# Patient Record
Sex: MALE | Race: WHITE
[De-identification: names, ages, dates, MRNs, and addresses within clinical notes are randomized per-mention and may not be internally consistent; named-entity substitution may affect disease eponyms.]

---

## 2019-02-01 ENCOUNTER — HOSPITAL ENCOUNTER (OUTPATIENT)
Dept: HOSPITAL 89 - ER | Age: 59
Setting detail: OBSERVATION
LOS: 3 days | Discharge: HOME | End: 2019-02-04
Attending: INTERNAL MEDICINE | Admitting: INTERNAL MEDICINE
Payer: SELF-PAY

## 2019-02-01 VITALS — DIASTOLIC BLOOD PRESSURE: 86 MMHG | SYSTOLIC BLOOD PRESSURE: 161 MMHG

## 2019-02-01 VITALS
BODY MASS INDEX: 40.32 KG/M2 | WEIGHT: 266 LBS | HEIGHT: 68 IN | BODY MASS INDEX: 40.32 KG/M2 | HEIGHT: 68 IN | WEIGHT: 266 LBS

## 2019-02-01 VITALS — DIASTOLIC BLOOD PRESSURE: 84 MMHG | SYSTOLIC BLOOD PRESSURE: 149 MMHG

## 2019-02-01 VITALS — DIASTOLIC BLOOD PRESSURE: 60 MMHG | SYSTOLIC BLOOD PRESSURE: 117 MMHG

## 2019-02-01 DIAGNOSIS — N45.3: ICD-10-CM

## 2019-02-01 DIAGNOSIS — I48.91: ICD-10-CM

## 2019-02-01 DIAGNOSIS — Z87.442: ICD-10-CM

## 2019-02-01 DIAGNOSIS — N50.812: ICD-10-CM

## 2019-02-01 DIAGNOSIS — N20.0: Primary | ICD-10-CM

## 2019-02-01 DIAGNOSIS — R93.41: ICD-10-CM

## 2019-02-01 LAB — PLATELET COUNT, AUTOMATED: 261 K/UL (ref 150–450)

## 2019-02-01 PROCEDURE — 83690 ASSAY OF LIPASE: CPT

## 2019-02-01 PROCEDURE — 96375 TX/PRO/DX INJ NEW DRUG ADDON: CPT

## 2019-02-01 PROCEDURE — 84443 ASSAY THYROID STIM HORMONE: CPT

## 2019-02-01 PROCEDURE — 84155 ASSAY OF PROTEIN SERUM: CPT

## 2019-02-01 PROCEDURE — 85025 COMPLETE CBC W/AUTO DIFF WBC: CPT

## 2019-02-01 PROCEDURE — 84520 ASSAY OF UREA NITROGEN: CPT

## 2019-02-01 PROCEDURE — 82435 ASSAY OF BLOOD CHLORIDE: CPT

## 2019-02-01 PROCEDURE — 99285 EMERGENCY DEPT VISIT HI MDM: CPT

## 2019-02-01 PROCEDURE — 84460 ALANINE AMINO (ALT) (SGPT): CPT

## 2019-02-01 PROCEDURE — 96361 HYDRATE IV INFUSION ADD-ON: CPT

## 2019-02-01 PROCEDURE — 82040 ASSAY OF SERUM ALBUMIN: CPT

## 2019-02-01 PROCEDURE — 82310 ASSAY OF CALCIUM: CPT

## 2019-02-01 PROCEDURE — 96374 THER/PROPH/DIAG INJ IV PUSH: CPT

## 2019-02-01 PROCEDURE — 82565 ASSAY OF CREATININE: CPT

## 2019-02-01 PROCEDURE — 96376 TX/PRO/DX INJ SAME DRUG ADON: CPT

## 2019-02-01 PROCEDURE — 74178 CT ABD&PLV WO CNTR FLWD CNTR: CPT

## 2019-02-01 PROCEDURE — 93306 TTE W/DOPPLER COMPLETE: CPT

## 2019-02-01 PROCEDURE — 93225 XTRNL ECG REC<48 HRS REC: CPT

## 2019-02-01 PROCEDURE — 84295 ASSAY OF SERUM SODIUM: CPT

## 2019-02-01 PROCEDURE — 84075 ASSAY ALKALINE PHOSPHATASE: CPT

## 2019-02-01 PROCEDURE — 84132 ASSAY OF SERUM POTASSIUM: CPT

## 2019-02-01 PROCEDURE — 36415 COLL VENOUS BLD VENIPUNCTURE: CPT

## 2019-02-01 PROCEDURE — 93226 XTRNL ECG REC<48 HR SCAN A/R: CPT

## 2019-02-01 PROCEDURE — 76870 US EXAM SCROTUM: CPT

## 2019-02-01 PROCEDURE — 93005 ELECTROCARDIOGRAM TRACING: CPT

## 2019-02-01 PROCEDURE — 82947 ASSAY GLUCOSE BLOOD QUANT: CPT

## 2019-02-01 PROCEDURE — 84450 TRANSFERASE (AST) (SGOT): CPT

## 2019-02-01 PROCEDURE — 87186 SC STD MICRODIL/AGAR DIL: CPT

## 2019-02-01 PROCEDURE — 82247 BILIRUBIN TOTAL: CPT

## 2019-02-01 PROCEDURE — 87088 URINE BACTERIA CULTURE: CPT

## 2019-02-01 PROCEDURE — 82374 ASSAY BLOOD CARBON DIOXIDE: CPT

## 2019-02-01 PROCEDURE — 81001 URINALYSIS AUTO W/SCOPE: CPT

## 2019-02-01 RX ADMIN — KETOROLAC TROMETHAMINE SCH MG: 30 INJECTION, SOLUTION INTRAMUSCULAR; INTRAVENOUS at 23:28

## 2019-02-01 RX ADMIN — THIAMINE HYDROCHLORIDE PRN MLS/HR: 100 INJECTION, SOLUTION INTRAMUSCULAR; INTRAVENOUS at 17:04

## 2019-02-01 RX ADMIN — KETOROLAC TROMETHAMINE SCH MG: 30 INJECTION, SOLUTION INTRAMUSCULAR; INTRAVENOUS at 18:10

## 2019-02-01 RX ADMIN — OXYCODONE HYDROCHLORIDE PRN MG: 5 TABLET ORAL at 17:04

## 2019-02-01 NOTE — HISTORY & PHYSICAL
History of Present Illness


Chief Complaint


L flank pain


History of Present Illness


58M presented with 3-4 days worsening L flank pain. PMHx significant for 4 


episodes of renal stones. Reports pain did not allow him to sleep last night, 


unable to find position of comfort and pain has been getting worse. Reports it 


is similar to after he has passed a stone previously. WBC significantly 


elevated, UA concerning for infection. CT shows distal R ureteral thickening 


above UPJ, L non obstructing stone in renal pelvis, some prostatic hypertrophy 


and mild symmetric bladder wall thickening. No evidence of fat stranding.





History


Problems:  


(1) Renal stones


Home Meds


Reported Medications


Citrulline (L-CITRULLINE) 25 Gm Powder, 25 GM MC QDAY


   2/1/19


Saw [place] (SAW PALMETTO) 500 Mg Capsule, 500 MG PO QDAY, CAPSULE


   2/1/19


Discontinued Scripts


Lorazepam (ATIVAN) 0.5 Mg Tablet, 0.5 MG PO Q4-6H PRN for ANXIETY, #10 TAB


   Prov:MONA PAUL FNP         1/10/17


Allergies:  


Coded Allergies:  


     No Known Drug Allergies (Unverified , 1/10/17)


Patient History:  


FH: HTN (hypertension)


Hx Smoking:  No


Caffeine Intake:  Coffee


Hx Alcohol Use:  Yes (occ)


Hx Substance Use Disorder:  Yes


Social Drugs:  Marijuana





Review of Systems


All Systems Reviewed/Normal:  Yes, Except as Noted


Constitutional:  No Fever


Gastrointestinal:  No Nausea, No Vomiting


Genitourinary:  Other (L flank pain)





Exam


Vital Signs





Vital Signs








  Date Time  Temp Pulse Resp B/P (MAP) Pulse Ox O2 Delivery O2 Flow Rate FiO2


 


2/1/19 17:21     93   


 


2/1/19 17:16      Nasal Cannula  


 


2/1/19 16:01 98.3 68 18 161/86 (111)   2.0 








General Appearance:  Alert, Awake, Afebrile (appears uncomfortable, no position 


of comfort)


Neuro:  No Gross deficits


ENT:  Normal


Cardiovascular:  Normal Rhythm & Peripheral Pulses


Respiratory:  No Respiratory Distress


GI:  Abd Soft and Non-Tender


Extremities:  Soft and Non Tender, Warm, Pulses, Perfused


Integumentary:  Skin Intact without Lesion / Mass





Medical Decision Making


Data Points


Result Diagram:  


2/1/19 1003                                                                     


          2/1/19 1003








Assessment and Plan


Problems:  


(1) Pyelonephritis


Status:  Acute


Assessment & Plan:  Elevated WBC count, UA appears to be infected, with L flank 


pain. Picture is also compatible with passage of renal stone possibly infected. 


Empiric ceftriaxone, pain control, hydration, monitor urine Cx. If doing well 


could discharge tomorrow. 





(2) Abnormal computed tomography of ureter


Assessment & Plan:  R ureteral thickening possibly reactive to one of his 


previous kidney stones. Recommend outpatient UA to evaluate for blood after out 


of acute phase vs CT with contrast during ureteral phase to evaluate for filling


defect. There are also indeterminant masses of L adrenal (2.8cm) and L kidney 


(1.8cm) which should be further characterized and borderline retroperitoneal 


lymphadenopathy possibly reactive in nature.








Venous Thromboembolism


Antithrombotics


Is Pt On Any Antithrombotics?:  Yes





Exam


Sepsis Risk:  No Definite Risk











THORNE DELGADO VALERA DO        Feb 1, 2019 21:04

## 2019-02-01 NOTE — ER REPORT
History and Physical


Time Seen By MD:  09:49


HPI/ROS


CHIEF COMPLAINT: Left flank, left lower abdominal pain.





HISTORY OF PRESENT ILLNESS: Patient is a 58-year-old male who was involved in 


[place]. States that yesterday evening. Tonight he developed worsening left 


back left flank and left groin pain. Patient does have a prior history of kidney


stones but states the pain feels different it is much more severe. Patient is 


unable to find a position of comfort. He does however preferred to lay on his 


right side. Pain is associated with some nausea. He denies any fevers or chills.


Denies any history of hypertension. Denies any other real significant past 


medical history.





REVIEW OF SYSTEMS:


Constitutional: No fever, no chills.


Eyes: No discharge.


ENT: No sore throat. 


Cardiovascular: No chest pain, no palpitations.


Respiratory: No cough, no shortness of breath.


Gastrointestinal: Severe left lower quadrant and left flank abdominal pain.


Genitourinary: No hematuria. Left scrotal pain


Musculoskeletal: Left sided back pain


Skin: No rashes.


Neurological: No headache.


Allergies:  


Coded Allergies:  


     No Known Drug Allergies (Unverified , 1/10/17)


Home Meds


Reported Medications


Citrulline (L-CITRULLINE) 25 Gm Powder, 25 GM MC


   2/1/19


Saw [place] (SAW PALMETTO) 500 Mg Capsule, 500 MG PO, CAPSULE


   2/1/19


Discontinued Scripts


Lorazepam (ATIVAN) 0.5 Mg Tablet, 0.5 MG PO Q4-6H PRN for ANXIETY, #10 TAB


   Prov:MONA PAUL FNKEARA         1/10/17


Past Medical/Surgical History


Patient denies any pertinent medical or surgical history.


Hx Substance Use Disorder:  No


Constitutional





Vital Sign - Last 24 Hours








 2/1/19 2/1/19 2/1/19 2/1/19





 09:57 09:58 10:01 10:16


 


Temp 98.2   


 


Pulse 73  71 74


 


Resp 22   10


 


B/P (MAP) 162/81 162/81 (108)  


 


Pulse Ox 92  92 95


 


O2 Delivery Room Air   


 


    





 2/1/19 2/1/19 2/1/19 2/1/19





 10:18 10:26 10:30 10:31


 


Pulse    71


 


Resp    21


 


B/P (MAP) 129/75 (93)  144/100 (115) 


 


Pulse Ox    91


 


O2 Flow Rate  2.0  





 2/1/19 2/1/19  





 10:46 11:02  


 


Pulse ???   


 


B/P (MAP)  151/79 (103)  








Physical Exam


General/Constitutional: Patient is awake, alert, nontoxic and in no acute 


respiratory distress. Patient moving on the gurney in extreme discomfort.


Head: Normocephalic and atraumatic.


Eyes: Conjunctival clear, Pupils are equal and reactive to light. Extraocular 


muscles are intact and symmetrical. Sclera are clear and anicteric.


Ears:External canals are clear. Tympanic membranes are clear with normal jose martin


dmarks and light reflex.


Nares: No rhinorrhea or bleeding. Turbinates are pink and moist.


Oropharyngeal: Mucous membranes are moist. 


Neck: Supple, no adenopathy.


Cardiovascular: Heart is regular rate and rhythm without audible murmurs, rubs 


or gallops. 


Pulmonary: Lungs are clear to auscultation bilaterally. There are no wheezes, 


rales, or rhonchi. Chest rise is symmetrical


Abdomen: Soft, nontender, no guarding or peritoneal signs.


Extremities: No gross deformities, No peripheral cyanosis. Able to move all 4 


extremities.


Neuro: Alert and oriented X3, 


Skin: No rashes, skin is warm dry and well perfused.





Medical Decision Making


Data Points


Result Diagram:  


2/1/19 1003                                                                     


          2/1/19 1003





Laboratory





Hematology








Test


 2/1/19


10:03 2/1/19


11:05


 


Red Blood Count


 5.78 M/uL


(4.00-5.60) 





 


Mean Corpuscular Volume


 86.5 fL


(80.0-96.0) 





 


Mean Corpuscular Hemoglobin


 29.9 pg


(26.0-33.0) 





 


Mean Corpuscular Hemoglobin


Concent 34.6 g/dL


(32.0-36.0) 





 


Red Cell Distribution Width


 13.5 %


(11.5-14.5) 





 


Mean Platelet Volume


 7.5 fL


(7.2-11.1) 





 


Neutrophils (%) (Auto)


 90.3 %


(39.4-72.5) 





 


Lymphocytes (%) (Auto)


 3.2 %


(17.6-49.6) 





 


Monocytes (%) (Auto)


 5.9 %


(4.1-12.4) 





 


Eosinophils (%) (Auto)


 0.3 %


(0.4-6.7) 





 


Basophils (%) (Auto)


 0.3 %


(0.3-1.4) 





 


Nucleated RBC Relative Count


(auto) 0.0 /100WBC 


 





 


Neutrophils # (Auto)


 21.7 K/uL


(2.0-7.4) 





 


Lymphocytes # (Auto)


 0.8 K/uL


(1.3-3.6) 





 


Monocytes # (Auto)


 1.4 K/uL


(0.3-1.0) 





 


Eosinophils # (Auto)


 0.1 K/uL


(0.0-0.5) 





 


Basophils # (Auto)


 0.1 K/uL


(0.0-0.1) 





 


Nucleated RBC Absolute Count


(auto) 0.01 K/uL 


 





 


Peripheral Blood Smear Yes Y/N  


 


Sodium Level


 136 mmol/L


(137-145) 





 


Potassium Level


 4.2 mmol/L


(3.5-5.0) 





 


Chloride Level


 108 mmol/L


() 





 


Carbon Dioxide Level


 20 mmol/L


(22-30) 





 


Blood Urea Nitrogen


 17 mg/dl


(9-21) 





 


Creatinine


 1.00 mg/dl


(0.66-1.25) 





 


Glomerular Filtration Rate


Calc > 60.0 


 





 


Random Glucose


 193 mg/dl


() 





 


Calcium Level


 9.6 mg/dl


(8.4-10.2) 





 


Total Bilirubin


 1.6 mg/dl


(0.2-1.3) 





 


Aspartate Amino Transf


(AST/SGOT) 30 U/L (0-35) 


 





 


Alanine Aminotransferase


(ALT/SGPT) 64 U/L (0-56) 


 





 


Alkaline Phosphatase 74 U/L (0-126)  


 


Total Protein


 7.8 g/dl


(6.3-8.2) 





 


Albumin


 4.7 g/dl


(3.5-5.0) 





 


Lipase


 76 U/L


() 





 


Urine Color  Yellow 


 


Urine Clarity  Clear 


 


Urine pH


 


 5.0 pH


(4.8-9.5)


 


Urine Specific Gravity  1.041 


 


Urine Protein


 


 30 mg/dL


(NEGATIVE)


 


Urine Glucose (UA)


 


 50 mg/dL


(NEGATIVE)


 


Urine Ketones


 


 20 mg/dL


(NEGATIVE)


 


Urine Blood


 


 Small


(NEGATIVE)


 


Urine Nitrite


 


 Positive


(NEGATIVE)


 


Urine Bilirubin


 


 Negative


(NEGATIVE)


 


Urine Urobilinogen


 


 Negative mg/dL


(0.2-1.9)


 


Urine Leukocyte Esterase


 


 Large


(NEGATIVE)


 


Urine RBC


 


 4 /HPF


(0-2/HPF)


 


Urine WBC


 


 49 /HPF


(0-5/HPF)


 


Urine Squamous Epithelial


Cells 


 Few /LPF


(</=FEW)


 


Urine Bacteria


 


 Moderate /HPF


(NONE-FEW)


 


Urine Mucus


 


 Few /HPF


(NONE-FEW)








Chemistry








Test


 2/1/19


10:03 2/1/19


11:05


 


White Blood Count


 24.0 k/uL


(4.5-11.0) 





 


Red Blood Count


 5.78 M/uL


(4.00-5.60) 





 


Hemoglobin


 17.3 g/dL


(14.0-18.0) 





 


Hematocrit


 50.0 %


(42.0-52.0) 





 


Mean Corpuscular Volume


 86.5 fL


(80.0-96.0) 





 


Mean Corpuscular Hemoglobin


 29.9 pg


(26.0-33.0) 





 


Mean Corpuscular Hemoglobin


Concent 34.6 g/dL


(32.0-36.0) 





 


Red Cell Distribution Width


 13.5 %


(11.5-14.5) 





 


Platelet Count


 261 K/uL


(150-450) 





 


Mean Platelet Volume


 7.5 fL


(7.2-11.1) 





 


Neutrophils (%) (Auto)


 90.3 %


(39.4-72.5) 





 


Lymphocytes (%) (Auto)


 3.2 %


(17.6-49.6) 





 


Monocytes (%) (Auto)


 5.9 %


(4.1-12.4) 





 


Eosinophils (%) (Auto)


 0.3 %


(0.4-6.7) 





 


Basophils (%) (Auto)


 0.3 %


(0.3-1.4) 





 


Nucleated RBC Relative Count


(auto) 0.0 /100WBC 


 





 


Neutrophils # (Auto)


 21.7 K/uL


(2.0-7.4) 





 


Lymphocytes # (Auto)


 0.8 K/uL


(1.3-3.6) 





 


Monocytes # (Auto)


 1.4 K/uL


(0.3-1.0) 





 


Eosinophils # (Auto)


 0.1 K/uL


(0.0-0.5) 





 


Basophils # (Auto)


 0.1 K/uL


(0.0-0.1) 





 


Nucleated RBC Absolute Count


(auto) 0.01 K/uL 


 





 


Peripheral Blood Smear Yes Y/N  


 


Glomerular Filtration Rate


Calc > 60.0 


 





 


Calcium Level


 9.6 mg/dl


(8.4-10.2) 





 


Total Bilirubin


 1.6 mg/dl


(0.2-1.3) 





 


Aspartate Amino Transf


(AST/SGOT) 30 U/L (0-35) 


 





 


Alanine Aminotransferase


(ALT/SGPT) 64 U/L (0-56) 


 





 


Alkaline Phosphatase 74 U/L (0-126)  


 


Total Protein


 7.8 g/dl


(6.3-8.2) 





 


Albumin


 4.7 g/dl


(3.5-5.0) 





 


Lipase


 76 U/L


() 





 


Urine Color  Yellow 


 


Urine Clarity  Clear 


 


Urine pH


 


 5.0 pH


(4.8-9.5)


 


Urine Specific Gravity  1.041 


 


Urine Protein


 


 30 mg/dL


(NEGATIVE)


 


Urine Glucose (UA)


 


 50 mg/dL


(NEGATIVE)


 


Urine Ketones


 


 20 mg/dL


(NEGATIVE)


 


Urine Blood


 


 Small


(NEGATIVE)


 


Urine Nitrite


 


 Positive


(NEGATIVE)


 


Urine Bilirubin


 


 Negative


(NEGATIVE)


 


Urine Urobilinogen


 


 Negative mg/dL


(0.2-1.9)


 


Urine Leukocyte Esterase


 


 Large


(NEGATIVE)


 


Urine RBC


 


 4 /HPF


(0-2/HPF)


 


Urine WBC


 


 49 /HPF


(0-5/HPF)


 


Urine Squamous Epithelial


Cells 


 Few /LPF


(</=FEW)


 


Urine Bacteria


 


 Moderate /HPF


(NONE-FEW)


 


Urine Mucus


 


 Few /HPF


(NONE-FEW)








Urinalysis








Test


 2/1/19


11:05


 


Urine Color Yellow 


 


Urine Clarity Clear 


 


Urine pH


 5.0 pH


(4.8-9.5)


 


Urine Specific Gravity 1.041 


 


Urine Protein


 30 mg/dL


(NEGATIVE)


 


Urine Glucose (UA)


 50 mg/dL


(NEGATIVE)


 


Urine Ketones


 20 mg/dL


(NEGATIVE)


 


Urine Blood


 Small


(NEGATIVE)


 


Urine Nitrite


 Positive


(NEGATIVE)


 


Urine Bilirubin


 Negative


(NEGATIVE)


 


Urine Urobilinogen


 Negative mg/dL


(0.2-1.9)


 


Urine Leukocyte Esterase


 Large


(NEGATIVE)


 


Urine RBC


 4 /HPF


(0-2/HPF)


 


Urine WBC


 49 /HPF


(0-5/HPF)


 


Urine Squamous Epithelial


Cells Few /LPF


(</=FEW)


 


Urine Bacteria


 Moderate /HPF


(NONE-FEW)


 


Urine Mucus


 Few /HPF


(NONE-FEW)











ED Course/Re-evaluation


ED Course


 02/01/2019 10:55:41 am pain has improved since administration of 1 mg of 


Dilaudid, 15 of IV Toradol, 4 of Zofran. Blood cell count shows white count of 


24,000 with 90% neutrophils. Waiting results of CT scan of the abdomen and pe


lvis.





 02/01/2019 11:32:48 am urinalysis is highly suggestive of infection based on 


elevated white count and nitrites. We'll give IV Rocephin. We'll also obtain a 


urine culture.


Decision to Disposition Date:  Feb 1, 2019


Decision to Disposition Time:  12:00





Depart


Departure


Latest Vital Signs





Vital Signs








  Date Time  Temp Pulse Resp B/P (MAP) Pulse Ox O2 Delivery O2 Flow Rate FiO2


 


2/1/19 11:02    151/79 (103)    


 


2/1/19 10:46  ???      


 


2/1/19 10:31   21  91   


 


2/1/19 10:26       2.0 


 


2/1/19 09:57 98.2     Room Air  








Impression:  


   Primary Impression:  


   Pyelonephritis


Condition:  Improved


Disposition:  Admitted from ER (to DR Robert)











DILLON LI MD              Feb 1, 2019 09:49

## 2019-02-01 NOTE — RADIOLOGY IMAGING REPORT
FACILITY: [facility] [place] 

 

PATIENT NAME: Santosh Lara

: 1960

MR: [id number]

V: [id number]

EXAM DATE: 

ORDERING PHYSICIAN: DILLON LI

TECHNOLOGIST: 

 

Location: [facility] [place]

Patient: Santosh Lara

: 1960

MRN: [id number]

Visit/Account:[id number]

Date of Sevice:  2019

 

ACCESSION #: [id number]

 

CT ABDOMEN PELVIS W & W/O CONTRAST

 

HISTORY:  Right flank pain, left abdomen pain

 

TECHNIQUE: Axial images acquired through the abdomen/pelvis both with and without IV contrast..  Joseph
nal and sagittal reformatting also performed.Dose Lowering Technique

 

One of the following dose optimization techniques was utilized in the performance of this exam: Autom
ated exposure control; adjustment of the mA and/or kV according to the patient's size; or use of an i
terative  reconstruction technique.  Specific details can be referenced in the facility's radiology C
T exam operational policy.

 

CONTRAST:  75 mL Isovue-370

 

COMPARISON:  None.

 

FINDINGS:

 

Visualized lung bases:  There is a 2 mm noncalcified pulmonary nodule anterolateral inferior right mi
ddle lobe best seen on image 34 of series 5

 

Hepatobiliary:  There is a 1 cm cyst lateral segment left lobe of the liver.  There is diffuse hepati
c steatosis and mild hepatomegaly

 

Spleen:  Borderline splenomegaly with spleen measuring 13.8 cm in length

 

Adrenals:  There is a 2.8 cm indeterminate left adrenal mass.  The right adrenal gland appears unrema
rkable

 

Pancreas:  Negative.

 

Kidneys ureters and bladder: There is a five mm nonobstructing calculus lower pole calyx of the right
 kidney .  There is a 1 to 2 mm calcification upper pole of the left kidney may be intraparenchymal..
  There is a 3 mm calcification interpolar region of the left kidney which may also be intraparenchym
al.

 

There is a 1.8 cm indeterminate isodense mass projecting from the lower pole of the left kidney that 
demonstrates mild contrast enhancement.  There is a 1.5 cm cyst upper pole of the right kidney and a 
subcentimeter indeterminate round hypodensity upper pole of the left kidney and an additional subcent
imeter hypodensity interpolar region of the left kidney both of these are too small to characterize b
y CT.

 

The bladder wall is mildly thickened

 

The distal right ureter at the right UVJ appears somewhat thickened

 

Genitalia:  Prostate gland is moderately enlarged impinging upon the floor the bladder

 

GI:  There is diverticulosis of the left-sided the colon although no CT evidence of acute diverticuli
tis  .  The appendix is visualized and does not appear inflamed

 

Vessels/spaces/nodes:  There is scattered mildly prominent retroperitoneal lymph nodes present a repr
esentative aortocaval lymph node measures 1.3 x 1.1 x 1.8 cm a preaortic lymph node measures 1 x 1.1 
x 1.4 cm right external iliac lymph node measures 1.2 x 1 x 1.4 cm left external iliac lymph node juancarlos
sures 1.5 x 1.1 x 1 cm

 

Bones/soft tissues:  There is a small umbilical hernia containing fat.  There are small bilateral ing
uinal hernias containing fat, left greater than right.

 

There are spondylotic changes of the thoracolumbar spine

 

Additional findings:  None pertinent.

 

IMPRESSION:

 

There is a 2 mm noncalcified pulmonary nodule anterolateral inferior right middle lobe

 

----------

F[employer] SOCIET[employer] FOLLOW-UP GUIDELINES FOR NEWLY DETECTED INCIDENTAL NODULES IN PERSONS 35 YEARS OF 
AGE OR OLDER.

 

*These recommendations do NOT apply to lung cancer screening, patients with immunosuppression or carolyn
ents with a known primary malignancy.

 

SOLITARY SOLID NODULE

 

If nodule size is < 6 mm:

*  Low risk patient ? No routine follow-up.

*  High risk patient ? Optional CT at 12 months.

 

If nodule size is 6-8 mm:

*  Low risk patient ? CT at 6-12 months, then consider CT at 18-24 months if no change.

*  High risk patient ? CT at 6-12 months, then CT at 18-24 months if no change.

 

If nodule size is > 8 mm:

*  Low risk patient ? Consider CT at 3, 9 and 24 months (if no change), PET/CT, tissue sampling or a 
combination thereof.

*  High risk patient ? Consider CT at 3, 9 and 24 months (if no change), PET/CT, tissue sampling, or 
a combination thereof.

 

LOW RISK PATIENT: Minimal or absent history of tobacco use and of other known risk factors.HIGH RISK 
PATIENT: Tobacco use, family history of lung cancer, upper pulmonary lobe location of nodule, presenc
e of emphysema, pulmonary fibrosis, older age.

 

Jorge H, Sebastián DP, Yefri CEDEÑO, et al. Guidelines for Management of Incidental Pulmonary Nodules Dete
cted on CT Images: From the Fleischner Society 2017. Radiology.

 

uchnipn

 

 

 

Diffuse hepatic steatosis and mild hepatomegaly

 

Borderline splenomegaly

 

There is a 2.8 cm indeterminate left adrenal mass.  This could be further evaluated with MR or dedica
annie CT of the adrenal glands with and without contrast

 

There is a 5 mm nonobstructing calculus lower pole calyx of the right kidney.

 

There is thickening of the distal right ureter at the right UVJ which is not ideally evaluated due to
 lack of intraluminal contrast 2.  Small calcified occasions within the left kidney may be intraparen
chymal

 

There is 1.8 cm indeterminate mass lower pole the left kidney which could be further evaluated with M
R

 

Additional hypodensities in the kidneys that are too small to characterize

 

Bladder wall is mildly thickened

 

Prostate gland is moderately enlarged impinging upon the floor the bladder

 

Diverticulosis of the left side of the colon although no CT evidence of acute diverticulitis.

 

There are mildly prominent retroperitoneal lymph nodes as detailed above.  Although these could be re
active although clinical follow-up recommended to exclude malignancy

 

Small umbilical hernia containing fat

## 2019-02-02 VITALS — DIASTOLIC BLOOD PRESSURE: 77 MMHG | SYSTOLIC BLOOD PRESSURE: 136 MMHG

## 2019-02-02 VITALS — SYSTOLIC BLOOD PRESSURE: 124 MMHG | DIASTOLIC BLOOD PRESSURE: 85 MMHG

## 2019-02-02 VITALS — DIASTOLIC BLOOD PRESSURE: 104 MMHG | SYSTOLIC BLOOD PRESSURE: 133 MMHG

## 2019-02-02 VITALS — SYSTOLIC BLOOD PRESSURE: 131 MMHG | DIASTOLIC BLOOD PRESSURE: 80 MMHG

## 2019-02-02 LAB — PLATELET COUNT, AUTOMATED: 222 K/UL (ref 150–450)

## 2019-02-02 RX ADMIN — KETOROLAC TROMETHAMINE SCH MG: 30 INJECTION, SOLUTION INTRAMUSCULAR; INTRAVENOUS at 23:30

## 2019-02-02 RX ADMIN — ONDANSETRON HYDROCHLORIDE PRN MG: 2 INJECTION, SOLUTION INTRAMUSCULAR; INTRAVENOUS at 18:53

## 2019-02-02 RX ADMIN — OXYCODONE HYDROCHLORIDE PRN MG: 5 TABLET ORAL at 09:05

## 2019-02-02 RX ADMIN — OXYCODONE HYDROCHLORIDE PRN MG: 5 TABLET ORAL at 01:02

## 2019-02-02 RX ADMIN — KETOROLAC TROMETHAMINE SCH MG: 30 INJECTION, SOLUTION INTRAMUSCULAR; INTRAVENOUS at 11:54

## 2019-02-02 RX ADMIN — SODIUM CHLORIDE SCH MLS/HR: 900 INJECTION, SOLUTION INTRAVENOUS at 14:00

## 2019-02-02 RX ADMIN — THIAMINE HYDROCHLORIDE PRN MLS/HR: 100 INJECTION, SOLUTION INTRAMUSCULAR; INTRAVENOUS at 03:08

## 2019-02-02 RX ADMIN — KETOROLAC TROMETHAMINE SCH MG: 30 INJECTION, SOLUTION INTRAMUSCULAR; INTRAVENOUS at 17:51

## 2019-02-02 RX ADMIN — OXYCODONE HYDROCHLORIDE PRN MG: 5 TABLET ORAL at 22:03

## 2019-02-02 RX ADMIN — KETOROLAC TROMETHAMINE SCH MG: 30 INJECTION, SOLUTION INTRAMUSCULAR; INTRAVENOUS at 05:16

## 2019-02-02 NOTE — RADIOLOGY IMAGING REPORT
FACILITY: [facility] [place] 

 

PATIENT NAME: Santosh Lara

: 1960

MR: [id number]

V: [id number]

EXAM DATE: 

ORDERING PHYSICIAN: GRISELDA ENNIS

TECHNOLOGIST: 

 

Location: [facility]

Patient: Santosh Lara

: 1960

MRN: [id number]

Visit/Account:[id number]

Date of Sevice:  2019

 

ACCESSION #: [id number]

 

SCROTAL ULTRASOUND

 

INDICATION: Left testicular swelling.

 

COMPARISON: None available.

 

FINDINGS:

Right testicle measures 3.9 x 2.1 x 2.9 cm in cc, AP, and transverse dimensions respectively.

There is normal arterial and venous blood flow.

No evidence of hydrocele..

No varicocele identified.

The right epididymal head measures 1.3 cm. Normal blood flow. No focal normality.

 

Left testicle measures 4.7 x 2.9 x 3.7 cm in cc, AP, and transverse dimensions respectively. There is
 a area with in the left testes which is heterogeneous and somewhat ill-defined measuring 1.7 x 2.4 c
m. No other focal abnormality.

Significant increased blood flow to the left testes.

Small left hydrocele.

No varicocele identified.

The left epididymal head measures 2.4 cm. Enlarged and heterogeneous with significant increased blood
 flow. No focal abnormality.

 

 

IMPRESSION:

1. Left epididymal orchitis. The left testes also shows a ill-defined heterogeneous area measuring 2.
4 cm. This could be secondary to the infection. However suggest follow-up exam after resolution of in
fection to assess for underlying lesion.

2. Small left hydrocele.

 

 

I called report to GRISELDA ENNIS at 2019 1:30 PM.

 

Report Dictated By: Paulo Clarke at 2019 1:22 PM

 

Report E-Signed By: Paulo Clarke  at 2019 1:31 PM

 

WSN:[id number]

## 2019-02-02 NOTE — HOSPITALIST PROGRESS NOTE
Subjective


Progress Notes


Subjective


He complains of pain lower abdomen and left testicle. Less in left flank. Low 


grade temp (99.4F).





Physical Exam





Vital Signs








  Date Time  Temp Pulse Resp B/P (MAP) Pulse Ox O2 Delivery O2 Flow Rate FiO2


 


2/2/19 11:11 99.4 59 18 131/80 (97) 94 Nasal Cannula 1.5 














Intake and Output 


 


 2/2/19





 07:00


 


Intake Total 3030 ml


 


Balance 3030 ml


 


 


 


Intake Oral 1250 ml


 


IV Total 1780 ml


 


# Voids 5


 


# Emeses 1








General Appearance:  Alert, Awake


Cardiovascular:  Regular Rate and Rhythm


Respiratory:  Clear to Auscultation


GI:  Other (Obese/fairly soft/BS present/no guarding or rebound)


:  Other (Left testicle is significantly swollen and tender to any 


palpation/difficult to discern structures)


Extremities:  Warm, Perfused


Psych:  Alert & Oriented X3


Result Diagram:  


2/2/19 0531 2/2/19 0531








Assessment and Plan


Problems:  


(1) Testicular pain, left


Status:  Acute


Assessment & Plan:  He has significant swelling/pain in left testicle. I suspect


he probably has infectious etiology, but cannot rule out vascular etiology. will


get STAT ultrasound. Discuss with Urology. Continue IV antibiotics for possible 


epididymo-orchitis. Watch closely.





(2) Pyelonephritis


Status:  Acute


Assessment & Plan:  Still a possibility, but suspect the left testicle is the 


source. Continue antibiotics. Will discuss with Urology. 





(3) Abnormal computed tomography of ureter


Assessment & Plan:  Right ureteral thickening possibly reactive to one of his 


previous kidney stones. Recommend outpatient UA to evaluate for blood after out 


of acute phase vs. CT with contrast during ureteral phase to evaluate for 


filling defect. There are also indeterminant masses of left adrenal (2.8cm) and 


left kidney (1.8cm) which should be further characterized and borderline 


retroperitoneal lymphadenopathy possibly reactive in nature.








Exam


Sepsis Risk:  No Definite Risk











GRISELDA ENNIS MD             Feb 2, 2019 13:00

## 2019-02-02 NOTE — ANTIMICROBIAL STEWARDSHIP
Antimicrobial Time Out


Antimicrobial Stewardship


MD Service:  Hospitalist


Indications:  UTI


Antimicrobial Used


Rocephin 2 gm ivp qday


Culture Results:  No





Eligible for PO Conversion


Eligable for PO Conversion:  No (WBC elevated)











KAUSHAL FOSTER                 Feb 2, 2019 10:38

## 2019-02-03 VITALS — DIASTOLIC BLOOD PRESSURE: 82 MMHG | SYSTOLIC BLOOD PRESSURE: 123 MMHG

## 2019-02-03 VITALS — SYSTOLIC BLOOD PRESSURE: 163 MMHG | DIASTOLIC BLOOD PRESSURE: 83 MMHG

## 2019-02-03 VITALS — DIASTOLIC BLOOD PRESSURE: 80 MMHG | SYSTOLIC BLOOD PRESSURE: 129 MMHG

## 2019-02-03 VITALS — SYSTOLIC BLOOD PRESSURE: 132 MMHG | DIASTOLIC BLOOD PRESSURE: 87 MMHG

## 2019-02-03 LAB — PLATELET COUNT, AUTOMATED: 240 K/UL (ref 150–450)

## 2019-02-03 RX ADMIN — KETOROLAC TROMETHAMINE SCH MG: 30 INJECTION, SOLUTION INTRAMUSCULAR; INTRAVENOUS at 05:50

## 2019-02-03 RX ADMIN — HYDROCODONE BITARTRATE AND ACETAMINOPHEN PRN EACH: 5; 325 TABLET ORAL at 14:12

## 2019-02-03 RX ADMIN — HYDROCODONE BITARTRATE AND ACETAMINOPHEN PRN EACH: 5; 325 TABLET ORAL at 18:23

## 2019-02-03 RX ADMIN — SODIUM CHLORIDE SCH MLS/HR: 900 INJECTION, SOLUTION INTRAVENOUS at 01:18

## 2019-02-03 RX ADMIN — ONDANSETRON HYDROCHLORIDE PRN MG: 2 INJECTION, SOLUTION INTRAMUSCULAR; INTRAVENOUS at 09:58

## 2019-02-03 RX ADMIN — HYDROCODONE BITARTRATE AND ACETAMINOPHEN PRN EACH: 5; 325 TABLET ORAL at 09:58

## 2019-02-03 RX ADMIN — ONDANSETRON HYDROCHLORIDE PRN MG: 2 INJECTION, SOLUTION INTRAMUSCULAR; INTRAVENOUS at 17:03

## 2019-02-03 RX ADMIN — RANITIDINE SCH MG: 150 TABLET ORAL at 20:15

## 2019-02-03 RX ADMIN — THIAMINE HYDROCHLORIDE PRN MLS/HR: 100 INJECTION, SOLUTION INTRAMUSCULAR; INTRAVENOUS at 03:11

## 2019-02-03 RX ADMIN — NAPROXEN SCH MG: 500 TABLET ORAL at 17:02

## 2019-02-03 NOTE — HOSPITALIST PROGRESS NOTE
Subjective


Progress Notes


Subjective


58M presented with L flank and groin pain. Pain continues to be difficult, 


afebrile.





Patient Complains of:


Gastrointestinal:  No Nausea, No Vomiting


Musculoskeletal:  Pain





Physical Exam





Vital Signs








  Date Time  Temp Pulse Resp B/P (MAP) Pulse Ox O2 Delivery O2 Flow Rate FiO2


 


2/3/19 11:50     85   


 


2/3/19 10:42  72      


 


2/3/19 10:13      Nasal Cannula 1.0 


 


2/3/19 05:56 99.6  18 163/83 (109)    














Intake and Output 


 


 2/3/19





 07:00


 


Intake Total 2917 ml


 


Output Total 100 ml


 


Balance 2817 ml


 


 


 


Intake Oral 240 ml


 


IV Total 2677 ml


 


Output Urine Total 100 ml


 


# Voids 4








General Appearance:  Alert, Awake, No Acute Distress, Afebrile


Neuro:  No Gross deficits


ENT:  Normal


Cardiovascular:  Other (irregularly irregular)


Respiratory:  No Respiratory Distress


GI:  Soft and Non-Tender


Extremities:  Soft and Non Tender, Warm, Pulses, Perfused; No Edema


Integumentary:  Skin Intact without Lesion / Mass


Result Diagram:  


2/3/19 0548                                                                     


          2/3/19 0548








Assessment and Plan


Problems:  


(1) Epididymo-orchitis without abscess


Assessment & Plan:  He has significant swelling/pain in left testicle.  


Discussed with urology, no indication for surgical intervention, will need outpt


follow up. Continue IV antibiotics for epididymo-orchitis. Culture pending.





(2) Abnormal computed tomography of ureter


Assessment & Plan:  Right ureteral thickening possibly reactive to one of his 


previous kidney stones. Recommend outpatient UA to evaluate for blood after out 


of acute phase vs. CT with contrast during ureteral phase to evaluate for 


filling defect. There are also indeterminant masses of left adrenal (2.8cm) and 


left kidney (1.8cm) which should be further characterized and borderline 


retroperitoneal lymphadenopathy possibly reactive in nature.








Exam


Sepsis Risk:  No Definite Risk











THORNE DELGADO VALERA DO        Feb 3, 2019 14:55

## 2019-02-03 NOTE — EKG
FACILITY: [facility] 

 

PATIENT NAME: MREISSA MCADAMS

: [id number]

MR: [id number]

V: [id number]

EXAM DATE: 

ORDERING PHYSICIAN: GRISELDA ENNIS

TECHNOLOGIST: HARJEET

 

Test Reason : IRREGULAR BEAT

Blood Pressure : ***/*** mmHG

Vent. Rate : 139 BPM     Atrial Rate : 144 BPM

   P-R Int : 000 ms          QRS Dur : 080 ms

    QT Int : 278 ms       P-R-T Axes : 000 034 030 degrees

   QTc Int : 423 ms

 

Atrial fibrillation with rapid ventricular response

Abnormal ECG

When compared with ECG of 10-BIANKA-2017 12:00,

Atrial fibrillation has replaced Sinus rhythm

Vent. rate has increased BY  77 BPM

 

Confirmed by GRADY SELBY ([id number]) on 2/3/2019 8:58:44 AM

 

Referred By:  RAYMON           Confirmed By:GRADY SELBY

## 2019-02-04 VITALS — DIASTOLIC BLOOD PRESSURE: 109 MMHG | SYSTOLIC BLOOD PRESSURE: 142 MMHG

## 2019-02-04 VITALS — DIASTOLIC BLOOD PRESSURE: 85 MMHG | SYSTOLIC BLOOD PRESSURE: 148 MMHG

## 2019-02-04 LAB — PLATELET COUNT, AUTOMATED: 233 K/UL (ref 150–450)

## 2019-02-04 RX ADMIN — NAPROXEN SCH MG: 500 TABLET ORAL at 08:33

## 2019-02-04 RX ADMIN — HYDROCODONE BITARTRATE AND ACETAMINOPHEN PRN EACH: 5; 325 TABLET ORAL at 01:04

## 2019-02-04 RX ADMIN — RANITIDINE SCH MG: 150 TABLET ORAL at 08:32

## 2019-02-04 RX ADMIN — HYDROCODONE BITARTRATE AND ACETAMINOPHEN PRN EACH: 5; 325 TABLET ORAL at 08:32

## 2019-02-04 NOTE — HOSPITALIST DEPART
Discharge Summary


Reason for Hosp/Final Diag:  


(1) Epididymo-orchitis without abscess


Hospital Course & Plan:  He presented with 3-4 days of worsening left flank pain


and scrotal pain.  He had significant swelling/pain in left testicle.  Discussed


with urology and there was no indication for surgical intervention based on the 


ultrasound. He was started on Ceftriaxone and switched to Levofloxacin 


yesterday.  His WBC is now normal.  His pain and swelling are improving.  It 


will likely take a month for the pain and swelling to resolve.  He will be sent 


out for a total of a 10 day course of Levofloxacin.





(2) Atrial fibrillation


Status:  Resolved


Hospital Course & Plan:  He had an asymptomatic period time in atrial 


fibrillation in the morning of 2/3 that was thought to be related to his acute 


illness.  It lasted for a couple of hours and resolved after metoprolol IV was 


given.  It has not recurred.  Echo showed an EF of 60-65% and RVSP c/w severe 


pulmonary hypertension.  TSH is pending.  He has been sent home on a 48 hour 


Holter monitor.  He is to follow up at the [facility] clinic.  





(3) Abnormal CT scan, kidney


Status:  Acute


Hospital Course & Plan:  There are also indeterminant masses of left adrenal 


(2.8cm) and left kidney (1.8cm).  The recommendation is a dedicated MRI and 


evaluation for subclinical hormonal hyperfunction. The patient wants to follow 


up with the [facility] to further that evaluation.








(4) Abnormal computed tomography of ureter


Hospital Course & Plan:  Right ureteral thickening possibly reactive to one of 


his previous kidney stones. Recommend outpatient UA to evaluate for blood after 


out of acute phase vs. CT with contrast during ureteral phase to evaluate for 


filling defect. 





Departure


Weight (Pounds):  266


Result Diagram:  


2/4/19 0523 2/4/19 0523














Item Value  Date Time


 


White Blood Count 24.0 k/uL H 2/1/19 1003


 


White Blood Count 18.5 k/uL H 2/2/19 0531


 


White Blood Count 11.9 k/uL H 2/3/19 0548


 


White Blood Count 8.1 k/uL 2/4/19 0523


 


Neutrophils (%) (Auto) 90.3 % H 2/1/19 1003


 


Neutrophils (%) (Auto) 86.2 % H 2/2/19 0531


 


Neutrophils (%) (Auto) 85.2 % H 2/3/19 0548


 


Neutrophils (%) (Auto) 71.1 % 2/4/19 0523


 


Hemoglobin 17.3 g/dL 2/1/19 1003


 


Hemoglobin 15.4 g/dL 2/2/19 0531


 


Hemoglobin 15.9 g/dL 2/3/19 0548


 


Hemoglobin 14.3 g/dL 2/4/19 0523


 


Sodium Level 136 mmol/L L 2/1/19 1003


 


Sodium Level 135 mmol/L L 2/2/19 0531


 


Sodium Level 137 mmol/L 2/3/19 0548


 


Sodium Level 137 mmol/L 2/4/19 0523


 


Creatinine 0.90 mg/dl 2/4/19 0523


 


Creatinine 1.10 mg/dl 2/3/19 0548


 


Creatinine 0.90 mg/dl 2/2/19 0531


 


Creatinine 1.00 mg/dl 2/1/19 1003


 


Blood Urea Nitrogen 17 mg/dl 2/1/19 1003


 


Blood Urea Nitrogen 22 mg/dl H 2/2/19 0531


 


Blood Urea Nitrogen 26 mg/dl H 2/3/19 0548


 


Blood Urea Nitrogen 25 mg/dl H 2/4/19 0523


 


Total Bilirubin 1.6 mg/dl H 2/1/19 1003


 


Total Bilirubin 1.5 mg/dl H 2/2/19 0531


 


Total Bilirubin 1.4 mg/dl H 2/3/19 0548


 


Aspartate Amino Transf (AST/SGOT) 40 U/L H 2/3/19 0548


 


Aspartate Amino Transf (AST/SGOT) 34 U/L 2/2/19 0531


 


Aspartate Amino Transf (AST/SGOT) 30 U/L 2/1/19 1003


 


Alanine Aminotransferase (ALT/SGPT) 64 U/L H 2/1/19 1003


 


Alanine Aminotransferase (ALT/SGPT) 66 U/L H 2/2/19 0531


 


Alanine Aminotransferase (ALT/SGPT) 94 U/L H 2/3/19 0548


 


Alkaline Phosphatase 88 U/L 2/3/19 0548


 


Alkaline Phosphatase 61 U/L 2/2/19 0531


 


Alkaline Phosphatase 74 U/L 2/1/19 1003


 


Urine Glucose (UA) 50 mg/dL H 2/1/19 1105


 


Urine Ketones 20 mg/dL H 2/1/19 1105


 


Urine Blood Small 2/1/19 1105


 


Urine Nitrite Positive H 2/1/19 1105


 


Urine Bilirubin Negative 2/1/19 1105


 


Urine Urobilinogen Negative mg/dL 2/1/19 1105


 


Urine Leukocyte Esterase Large H 2/1/19 1105


 


Urine RBC 4 /HPF 2/1/19 1105


 


Urine WBC 49 /HPF 2/1/19 1105


 


Urine Squamous Epithelial Cells Few /LPF 2/1/19 1105


 


Urine Bacteria Moderate /HPF H 2/1/19 1105








SPEC #: [id number]        HOWARD: 02/01/     STATUS:  COMP           REQ 


#: [id number]


                            RECD: 02/01/     [facility] DR: DILLON LI MD 


          


SOURCE: [facility]                ENTR: 02/01/     [facility] DR:                    


          


SPDESC:                                                                         


          


ORDERED:  CULT URINE                                                            


 


-------------


-------------------------------------------------------------------------------





  Procedure                         Result                         Verified     


         


----------------------------


----------------------------------------------------------------





  URINE CULTURE  Final                                             02/03/


     Organism 1                     ESCHERICHIA COLI


                                    >100,000 COL/ML





                                    ESC COLI         


                                    M.I.C.    RX     


                                    --------- ---    


     AMPICILLIN                     4          S     


     AMPICILLIN/SULBACTAM           <=2        S     


     CEFAZOLIN                      <=4        S     


     CEFTAZIDIME                    <=1        S     


     CEFTRIAXONE                    <=1        S     


     CEFEPIME                       <=1        S     


     CEFOXITIN                      <=4        S     


     ERTAPENEM                      <=0.5      S     


     CIPROFLOXACIN                  <=0.25     S     


     GENTAMICIN                     <=1        S     


     IMIPENEM                       <=0.25     S     


     LEVOFLOXACIN                   <=0.12     S     


     NITROFURANTOIN                 <=16       S     


     PIPERACILLIN/TAZOBACTAM        <=4        S     


     TOBRAMYCIN                     <=1        S     


     TRIMETHOPRIM/SULFAMETHOXAZOLE  <=20       S


Imaging


2/4/19 Echo - (see the official report for details) Echo showed an EF of 60-65% 


and RVSP c/w severe pulmonary hypertension





2/2/19 Testicular US - 1. Left epididymal orchitis. The left testes also shows a


ill-defined heterogeneous area measuring 2.4 cm. This could be secondary to the 


infection. However suggest follow-up exam after resolution of infection to 


assess for underlying lesion.


2. Small left hydrocele.





2/1/19 Abd/Pelvis CT - There is a 2 mm noncalcified pulmonary nodule 


anterolateral inferior right middle lobe


 


----------


[employer] FOLLOW-UP GUIDELINES FOR NEWLY DETECTED INCIDENTAL NODULES IN


PERSONS 35 YEARS OF AGE OR OLDER.


 


*These recommendations do NOT apply to lung cancer screening, patients with 


immunosuppression or patients with a known primary malignancy.


 


SOLITARY SOLID NODULE


 


If nodule size is < 6 mm:


*  Low risk patient ? No routine follow-up.


*  High risk patient ? Optional CT at 12 months.


 


If nodule size is 6-8 mm:


*  Low risk patient ? CT at 6-12 months, then consider CT at 18-24 months if no 


change.


*  High risk patient ? CT at 6-12 months, then CT at 18-24 months if no change.


 


If nodule size is > 8 mm:


*  Low risk patient ? Consider CT at 3, 9 and 24 months (if no change), PET/CT, 


tissue sampling or a combination thereof.


*  High risk patient ? Consider CT at 3, 9 and 24 months (if no change), PET/CT,


tissue sampling, or a combination thereof.


 


LOW RISK PATIENT: Minimal or absent history of tobacco use and of other known 


risk factors.HIGH RISK PATIENT: Tobacco use, family history of lung cancer, 


upper pulmonary lobe location of nodule, presence of emphysema, pulmonary 


fibrosis, older age.


 


Jorge H, Sebastián DP, Yefri JM, et al. Guidelines for Management of Incidental 


Pulmonary Nodules Detected on CT Images: From the [employer] 2017. 


Radiology.


 


uchnipn


 


 


 


Diffuse hepatic steatosis and mild hepatomegaly


 


Borderline splenomegaly


 


There is a 2.8 cm indeterminate left adrenal mass.  This could be further 


evaluated with MR or dedicated CT of the adrenal glands with and without 


contrast


 


There is a 5 mm nonobstructing calculus lower pole calyx of the right kidney.


 


There is thickening of the distal right ureter at the right UVJ which is not 


ideally evaluated due to lack of intraluminal contrast 2.  Small calcified 


occasions within the left kidney may be intraparenchymal


 


There is 1.8 cm indeterminate mass lower pole the left kidney which could be 


further evaluated with MR


 


Additional hypodensities in the kidneys that are too small to characterize


 


Bladder wall is mildly thickened


 


Prostate gland is moderately enlarged impinging upon the floor the bladder


 


Diverticulosis of the left side of the colon although no CT evidence of acute 


diverticulitis.


 


There are mildly prominent retroperitoneal lymph nodes as detailed above.  


Although these could be reactive although clinical follow-up recommended to 


exclude malignancy


 


Small umbilical hernia containing fat


EKG


Vent. Rate : 076 BPM     Atrial Rate : 076 BPM


   P-R Int : 144 ms          QRS Dur : 096 ms


    QT Int : 402 ms       P-R-T Axes : 071 018 032 degrees


   QTc Int : 452 ms


 


Sinus rhythm with occasional premature ventricular complexes


Otherwise normal ECG





Vent. Rate : 139 BPM     Atrial Rate : 144 BPM


   P-R Int : 000 ms          QRS Dur : 080 ms


    QT Int : 278 ms       P-R-T Axes : 000 034 030 degrees


   QTc Int : 423 ms


 


Atrial fibrillation with rapid ventricular response


Abnormal ECG


When compared with ECG of 10-BIANKA-2017 12:00,


Atrial fibrillation has replaced Sinus rhythm


Vent. rate has increased BY  77 BPM


 


Confirmed by GRADY SELBY ([id number]) on 2/3/2019 8:58:44 AM


Condition:  Improved


Discharge:  Home





Discharge Instructions


Home Meds


Active Scripts


Hydrocodone Bit/Acetaminophen (HYDROCODON-ACETAMINOPHEN 5-325) 1 Each Tablet, 1 


EACH PO Q8H PRN for pain, #14 TAB


   Prov:GIL PORRAS MD         2/4/19


Levofloxacin 750 Mg Tab (LEVOFLOXACIN 750 MG TAB) 750 Mg Tablet, 750 MG PO 


QDAY@10, #10  


   Prov:GIL PORRAS MD         2/4/19


Naproxen (NAPROXEN) 500 Mg Tablet, 500 MG PO Q12H PRN for pain, #7  


   Prov:GIL PORRAS MD         2/4/19


Acetaminophen (ACETAMINOPHEN) 500 Mg Tablet, 1000 MG PO Q8H PRN for PAIN for 7 


Days,  


   Prov:GIL PORRAS MD         2/4/19


Reported Medications


Citrulline (L-CITRULLINE) 25 Gm Powder, 25 GM MC QDAY


   2/1/19


Saw [place] (SAW [place]) 500 Mg Capsule, 500 MG PO QDAY, CAPSULE


   2/1/19


Discontinued Scripts


Lorazepam (ATIVAN) 0.5 Mg Tablet, 0.5 MG PO Q4-6H PRN for ANXIETY, #10 TAB


   Prov:MONA PAUL FNKEARA         1/10/17


Diet:  Regular


Activity:  As Tolerated


Special Instructions:  


Go to the ER for fevers, worsening pain scrotal pain, bloody discharge


from penis, increased scrotal swelling, chest pain, shortness of breath,


and fast heart rate.





Look on the patient portal to find the results of the echocardiogram,


Holter Monitor and the TSH.  For questions, go to the [facility].





A dedicated MRI of the adrenal gland and kidney are recommended in the


next couple of months to follow up the masses seen on CT.  It is also


recommended to get hormonal screening of the adrenal gland to make sure


the mass isn't active.





Also, it is recommended to get a followup urine test to look for blood in


about a month or so.


Copies to:   ; [facility]





Venous Thromboembolism


Antithrombotics


Is Pt On Any Antithrombotics?:  Yes











GIL PORRAS MD                Feb 4, 2019 15:08

## 2019-02-04 NOTE — EKG
FACILITY: [facility] 

 

PATIENT NAME: MERISSA MCADAMS

: [id number]

MR: [id number]

V: [id number]

EXAM DATE: 

ORDERING PHYSICIAN: GIL PORRAS

TECHNOLOGIST: HARJEET

 

Test Reason : A-FIB

Blood Pressure : ***/*** mmHG

Vent. Rate : 076 BPM     Atrial Rate : 076 BPM

   P-R Int : 144 ms          QRS Dur : 096 ms

    QT Int : 402 ms       P-R-T Axes : 071 018 032 degrees

   QTc Int : 452 ms

 

Sinus rhythm with occasional premature ventricular complexes

Otherwise normal ECG

Compared to previous, atrial fibrillation with RVR has been replaced by NSR

Confirmed by GIL PORRAS ([id number]) on 2019 8:39:47 PM

 

Referred By:  VERN           Confirmed By:GIL PORRAS

## 2019-02-05 ENCOUNTER — HOSPITAL ENCOUNTER (EMERGENCY)
Dept: HOSPITAL 89 - ER | Age: 59
LOS: 1 days | Discharge: HOME | End: 2019-02-06
Payer: SELF-PAY

## 2019-02-05 VITALS — WEIGHT: 266 LBS | BODY MASS INDEX: 40.44 KG/M2

## 2019-02-05 VITALS — DIASTOLIC BLOOD PRESSURE: 75 MMHG | SYSTOLIC BLOOD PRESSURE: 152 MMHG

## 2019-02-05 DIAGNOSIS — K29.80: Primary | ICD-10-CM

## 2019-02-05 DIAGNOSIS — N45.2: ICD-10-CM

## 2019-02-05 DIAGNOSIS — I27.20: ICD-10-CM

## 2019-02-05 LAB — PLATELET COUNT, AUTOMATED: 292 K/UL (ref 150–450)

## 2019-02-05 PROCEDURE — 71275 CT ANGIOGRAPHY CHEST: CPT

## 2019-02-05 PROCEDURE — 96376 TX/PRO/DX INJ SAME DRUG ADON: CPT

## 2019-02-05 PROCEDURE — 82374 ASSAY BLOOD CARBON DIOXIDE: CPT

## 2019-02-05 PROCEDURE — 82310 ASSAY OF CALCIUM: CPT

## 2019-02-05 PROCEDURE — 81001 URINALYSIS AUTO W/SCOPE: CPT

## 2019-02-05 PROCEDURE — 85025 COMPLETE CBC W/AUTO DIFF WBC: CPT

## 2019-02-05 PROCEDURE — 82435 ASSAY OF BLOOD CHLORIDE: CPT

## 2019-02-05 PROCEDURE — 82565 ASSAY OF CREATININE: CPT

## 2019-02-05 PROCEDURE — 84132 ASSAY OF SERUM POTASSIUM: CPT

## 2019-02-05 PROCEDURE — 84295 ASSAY OF SERUM SODIUM: CPT

## 2019-02-05 PROCEDURE — 99284 EMERGENCY DEPT VISIT MOD MDM: CPT

## 2019-02-05 PROCEDURE — 96375 TX/PRO/DX INJ NEW DRUG ADDON: CPT

## 2019-02-05 PROCEDURE — 84460 ALANINE AMINO (ALT) (SGPT): CPT

## 2019-02-05 PROCEDURE — 93005 ELECTROCARDIOGRAM TRACING: CPT

## 2019-02-05 PROCEDURE — 82247 BILIRUBIN TOTAL: CPT

## 2019-02-05 PROCEDURE — 84155 ASSAY OF PROTEIN SERUM: CPT

## 2019-02-05 PROCEDURE — 84450 TRANSFERASE (AST) (SGOT): CPT

## 2019-02-05 PROCEDURE — 84075 ASSAY ALKALINE PHOSPHATASE: CPT

## 2019-02-05 PROCEDURE — 74177 CT ABD & PELVIS W/CONTRAST: CPT

## 2019-02-05 PROCEDURE — 82040 ASSAY OF SERUM ALBUMIN: CPT

## 2019-02-05 PROCEDURE — 84520 ASSAY OF UREA NITROGEN: CPT

## 2019-02-05 PROCEDURE — 84484 ASSAY OF TROPONIN QUANT: CPT

## 2019-02-05 PROCEDURE — 96374 THER/PROPH/DIAG INJ IV PUSH: CPT

## 2019-02-05 PROCEDURE — 82947 ASSAY GLUCOSE BLOOD QUANT: CPT

## 2019-02-05 NOTE — ER REPORT
History and Physical


Time Seen By MD:  18:24


Hx. of Stated Complaint:  


pt started hydrocodone/acetaminophen 5 325 last night and has had epigastric 


pain and nausea and  sob since, [facility]'d yesterday from [facility]


CHIEF COMPLAINT: Shortness of breath, abdominal pain with bloating





HISTORY OF PRESENT ILLNESS: This is a 58-year-old male. He was just released 


from the hospital yesterday after being in the hospital for flank pain and 


infection such as orchitis. He is on Levaquin and they sent him home with 


hydrocodone for pain. He took a hydrocodone tablet last night and started to 


have severe stomach pain with some bloating. He also had a rash and felt short 


of breath. No feeling of his mouth or throat swelling. He also felt an abnormal 


feeling in his chest and he did have a run of atrial fibrillation while he was 


in the hospital, one short run only and had a echocardiogram done and is on a 


Holter monitor. His echocardiogram showed a normal ejection fraction but did 


show pulmonary hypertension. Further workup has yet to be done on this. The 


cousin of the abnormal feeling in his chest and the shortness of breath or was 


some concern about further atrial fibrillation although his electrical tracings 


on the monitor have been normal. He could be having paroxysms of atrial 


fibrillation. He was transferred here by ambulance. There is also the concern of


the abdominal pain in the epigastric area with bloating but this could be a side


effect of the hydrocodone as well. He denies any fevers or chills. He worries 


that he may have been released from the hospital too soon. Urine is still a dark


color. He had 3 small bowel movements today but doesn't feel like the abdomen is


back Or that this is a problem with obstruction or constipation. Associated 


nausea but no vomiting.





REVIEW OF SYSTEMS:


Constitutional: No fever or chills.


Eyes: No vision changes.


ENT: No sore throat. No congestion.


Cardiovascular: No chest pain.


Respiratory: As above.


Gastrointestinal:  As above.


Genitourinary: As above.


Musculoskeletal: No back pain. No extremity pain.


Skin: Rash as noted.


Neurological: No numbness. No weakness.


Allergies:  


Coded Allergies:  


     No Known Drug Allergies (Unverified , 1/10/17)


Home Meds


Active Scripts


Ondansetron 4 Mg Odt (ONDANSETRON 4 MG ODT) 4 Mg Tab.rapdis, 4 MG PO ONCE PRN 


for NAUSEA/VOMITING, #20 TAB 0 Refills


   Prov:JLUIS HOUSER MD         2/5/19


Tramadol Hcl (TRAMADOL HCL) 50 Mg Tablet, 50 MG PO Q6H PRN for PAIN, #12 TAB 0 


Refills


   Prov:JLUIS HOUSER MD         2/5/19


Sucralfate (CARAFATE) 1 Gm Tablet, 1 GM PO QID, #120 TAB 0 Refills


   Prov:JLUIS HOUSER MD         2/5/19


Omeprazole (OMEPRAZOLE) 40 Mg Capsule.dr, 40 MG PO BID, #60 CAP 0 Refills


   Prov:JLUIS HOUSER MD         2/5/19


Hydrocodone Bit/Acetaminophen (HYDROCODON-ACETAMINOPHEN 5-325) 1 Each Tablet, 1 


EACH PO Q8H PRN for pain, #14 TAB


   Prov:GIL PORRAS MD         2/4/19


Levofloxacin 750 Mg Tab (LEVOFLOXACIN 750 MG TAB) 750 Mg Tablet, 750 MG PO 


QDAY@10, #10  


   Prov:GIL PORRAS MD         2/4/19


Naproxen (NAPROXEN) 500 Mg Tablet, 500 MG PO Q12H PRN for pain, #7  


   Prov:GIL PORRAS MD         2/4/19


Acetaminophen (ACETAMINOPHEN) 500 Mg Tablet, 1000 MG PO Q8H PRN for PAIN for 7 


Days,  


   Prov:GIL PORRAS MD         2/4/19


Reported Medications


Citrulline (L-CITRULLINE) 25 Gm Powder, 25 GM MC QDAY


   2/1/19


Saw [place] (SAW [place]) 500 Mg Capsule, 500 MG PO QDAY, CAPSULE


   2/1/19


Discontinued Scripts


Lorazepam (ATIVAN) 0.5 Mg Tablet, 0.5 MG PO Q4-6H PRN for ANXIETY, #10 TAB


   Prov:MONA PAUL FNKEARA         1/10/17


Reviewed Nurses Notes:  Yes


Hx Smoking:  No


Hx Substance Use Disorder:  No (marijuana qd )


Hx Alcohol Use:  Yes (occ)


Constitutional





Vital Sign - Last 24 Hours








 2/5/19 2/5/19 2/5/19 2/5/19





 18:13 19:30 20:00 20:30


 


Temp 99.5   


 


Pulse 61 61 56 55


 


Resp 20   


 


B/P (MAP) 147/102 145/69 (94)  


 


Pulse Ox 93 88 93 93


 


O2 Delivery Room Air   


 


    





 2/5/19 2/5/19 2/5/19 2/5/19





 21:00 21:30 22:00 22:30


 


Pulse 54 56 55 55


 


Pulse Ox 95 96 96 95





 2/5/19 2/5/19 2/5/19 





 22:49 23:00 23:30 


 


Pulse  64 61 


 


B/P (MAP) 152/75 (100)   


 


Pulse Ox  93 89 








Physical Exam


   General Appearance: The patient is alert. No acute distress.


Eyes: Pupils are equal, round. No pallor, injection or icterus. 


ENT: Mucous membranes are moist. Normal oral mucosa. Posterior oropharynx is 


normal.


Neck: Supple and non tender.


Respiratory: Lungs are clear to auscultation.


Cardiovascular: Regular rate and rhythm. No murmurs, gallops or rubs. Normal 


capillary refill. No edema. 


Gastrointestinal: Abdomen is soft, it is tender in the epigastric area. It is 


distended. No masses or organomegaly. Normal active bowel sounds. No 


costovertebral angle tenderness with percussion.


Neurological: Alert and oriented x3. No focal neurologic deficits


Skin: Warm and dry. Scattered red macular rashes on the trunk and extremities


Musculoskeletal: Extremities are nontender. No tenderness in palpation of the 


cervical, thoracic and lumbar spine.





DIFFERENTIAL DIAGNOSIS: After history and physical exam, differential diagnosis 


was considered for shortness of breath including but not limited to pulmonary 


infectious process, pulmonary embolus and arrhythmia or heart failure. It also 


be concerning for an adverse reaction to the hydrocodone. Epigastric discomfort 


and bloating of uncertain etiology but will also get a CT scan repeated of the 


abdomen and pelvis and check metabolic panel.





Medical Decision Making


Data Points


Result Diagram:  


2/5/19 1630                                                                     


          2/5/19 1630





Laboratory





Hematology








Test


 2/5/19


16:30 2/5/19


19:20


 


Red Blood Count


 5.02 M/uL


(4.00-5.60) 





 


Mean Corpuscular Volume


 86.5 fL


(80.0-96.0) 





 


Mean Corpuscular Hemoglobin


 30.0 pg


(26.0-33.0) 





 


Mean Corpuscular Hemoglobin


Concent 34.7 g/dL


(32.0-36.0) 





 


Red Cell Distribution Width


 13.0 %


(11.5-14.5) 





 


Mean Platelet Volume


 7.6 fL


(7.2-11.1) 





 


Neutrophils (%) (Auto)


 73.7 %


(39.4-72.5) 





 


Lymphocytes (%) (Auto)


 12.7 %


(17.6-49.6) 





 


Monocytes (%) (Auto)


 12.2 %


(4.1-12.4) 





 


Eosinophils (%) (Auto)


 0.6 %


(0.4-6.7) 





 


Basophils (%) (Auto)


 0.8 %


(0.3-1.4) 





 


Nucleated RBC Relative Count


(auto) 0.1 /100WBC 


 





 


Neutrophils # (Auto)


 6.9 K/uL


(2.0-7.4) 





 


Lymphocytes # (Auto)


 1.2 K/uL


(1.3-3.6) 





 


Monocytes # (Auto)


 1.1 K/uL


(0.3-1.0) 





 


Eosinophils # (Auto)


 0.1 K/uL


(0.0-0.5) 





 


Basophils # (Auto)


 0.1 K/uL


(0.0-0.1) 





 


Nucleated RBC Absolute Count


(auto) 0.01 K/uL 


 





 


Peripheral Blood Smear Yes Y/N  


 


Sodium Level


 136 mmol/L


(137-145) 





 


Potassium Level


 3.6 mmol/L


(3.5-5.0) 





 


Chloride Level


 108 mmol/L


() 





 


Carbon Dioxide Level


 22 mmol/L


(22-30) 





 


Blood Urea Nitrogen


 18 mg/dl


(9-21) 





 


Creatinine


 0.80 mg/dl


(0.66-1.25) 





 


Glomerular Filtration Rate


Calc > 60.0 


 





 


Random Glucose


 89 mg/dl


() 





 


Calcium Level


 9.2 mg/dl


(8.4-10.2) 





 


Total Bilirubin


 0.8 mg/dl


(0.2-1.3) 





 


Aspartate Amino Transf


(AST/SGOT) 33 U/L (0-35) 


 





 


Alanine Aminotransferase


(ALT/SGPT) 77 U/L (0-56) 


 





 


Alkaline Phosphatase 86 U/L (0-126)  


 


Troponin I 0.028 ng/ml  


 


Total Protein


 6.8 g/dl


(6.3-8.2) 





 


Albumin


 3.8 g/dl


(3.5-5.0) 





 


Urine Color  Yellow 


 


Urine Clarity  Clear 


 


Urine pH


 


 6.0 pH


(4.8-9.5)


 


Urine Specific Gravity  1.025 


 


Urine Protein


 


 Negative mg/dL


(NEGATIVE)


 


Urine Glucose (UA)


 


 Negative mg/dL


(NEGATIVE)


 


Urine Ketones


 


 20 mg/dL


(NEGATIVE)


 


Urine Blood


 


 Negative


(NEGATIVE)


 


Urine Nitrite


 


 Negative


(NEGATIVE)


 


Urine Bilirubin


 


 Small


(NEGATIVE)


 


Urine Urobilinogen


 


 4.0 mg/dL


(0.2-1.9)


 


Urine Leukocyte Esterase


 


 Small


(NEGATIVE)


 


Urine RBC


 


 2 /HPF


(0-2/HPF)


 


Urine WBC


 


 10 /HPF


(0-5/HPF)


 


Urine Squamous Epithelial


Cells 


 None /LPF


(</=FEW)


 


Urine Bacteria


 


 Negative /HPF


(NONE-FEW)


 


Urine Mucus


 


 Few /HPF


(NONE-FEW)








Chemistry








Test


 2/5/19


16:30 2/5/19


19:20


 


White Blood Count


 9.3 k/uL


(4.5-11.0) 





 


Red Blood Count


 5.02 M/uL


(4.00-5.60) 





 


Hemoglobin


 15.1 g/dL


(14.0-18.0) 





 


Hematocrit


 43.4 %


(42.0-52.0) 





 


Mean Corpuscular Volume


 86.5 fL


(80.0-96.0) 





 


Mean Corpuscular Hemoglobin


 30.0 pg


(26.0-33.0) 





 


Mean Corpuscular Hemoglobin


Concent 34.7 g/dL


(32.0-36.0) 





 


Red Cell Distribution Width


 13.0 %


(11.5-14.5) 





 


Platelet Count


 292 K/uL


(150-450) 





 


Mean Platelet Volume


 7.6 fL


(7.2-11.1) 





 


Neutrophils (%) (Auto)


 73.7 %


(39.4-72.5) 





 


Lymphocytes (%) (Auto)


 12.7 %


(17.6-49.6) 





 


Monocytes (%) (Auto)


 12.2 %


(4.1-12.4) 





 


Eosinophils (%) (Auto)


 0.6 %


(0.4-6.7) 





 


Basophils (%) (Auto)


 0.8 %


(0.3-1.4) 





 


Nucleated RBC Relative Count


(auto) 0.1 /100WBC 


 





 


Neutrophils # (Auto)


 6.9 K/uL


(2.0-7.4) 





 


Lymphocytes # (Auto)


 1.2 K/uL


(1.3-3.6) 





 


Monocytes # (Auto)


 1.1 K/uL


(0.3-1.0) 





 


Eosinophils # (Auto)


 0.1 K/uL


(0.0-0.5) 





 


Basophils # (Auto)


 0.1 K/uL


(0.0-0.1) 





 


Nucleated RBC Absolute Count


(auto) 0.01 K/uL 


 





 


Peripheral Blood Smear Yes Y/N  


 


Glomerular Filtration Rate


Calc > 60.0 


 





 


Calcium Level


 9.2 mg/dl


(8.4-10.2) 





 


Total Bilirubin


 0.8 mg/dl


(0.2-1.3) 





 


Aspartate Amino Transf


(AST/SGOT) 33 U/L (0-35) 


 





 


Alanine Aminotransferase


(ALT/SGPT) 77 U/L (0-56) 


 





 


Alkaline Phosphatase 86 U/L (0-126)  


 


Troponin I 0.028 ng/ml  


 


Total Protein


 6.8 g/dl


(6.3-8.2) 





 


Albumin


 3.8 g/dl


(3.5-5.0) 





 


Urine Color  Yellow 


 


Urine Clarity  Clear 


 


Urine pH


 


 6.0 pH


(4.8-9.5)


 


Urine Specific Gravity  1.025 


 


Urine Protein


 


 Negative mg/dL


(NEGATIVE)


 


Urine Glucose (UA)


 


 Negative mg/dL


(NEGATIVE)


 


Urine Ketones


 


 20 mg/dL


(NEGATIVE)


 


Urine Blood


 


 Negative


(NEGATIVE)


 


Urine Nitrite


 


 Negative


(NEGATIVE)


 


Urine Bilirubin


 


 Small


(NEGATIVE)


 


Urine Urobilinogen


 


 4.0 mg/dL


(0.2-1.9)


 


Urine Leukocyte Esterase


 


 Small


(NEGATIVE)


 


Urine RBC


 


 2 /HPF


(0-2/HPF)


 


Urine WBC


 


 10 /HPF


(0-5/HPF)


 


Urine Squamous Epithelial


Cells 


 None /LPF


(</=FEW)


 


Urine Bacteria


 


 Negative /HPF


(NONE-FEW)


 


Urine Mucus


 


 Few /HPF


(NONE-FEW)








Urinalysis








Test


 2/5/19


19:20


 


Urine Color Yellow 


 


Urine Clarity Clear 


 


Urine pH


 6.0 pH


(4.8-9.5)


 


Urine Specific Gravity 1.025 


 


Urine Protein


 Negative mg/dL


(NEGATIVE)


 


Urine Glucose (UA)


 Negative mg/dL


(NEGATIVE)


 


Urine Ketones


 20 mg/dL


(NEGATIVE)


 


Urine Blood


 Negative


(NEGATIVE)


 


Urine Nitrite


 Negative


(NEGATIVE)


 


Urine Bilirubin


 Small


(NEGATIVE)


 


Urine Urobilinogen


 4.0 mg/dL


(0.2-1.9)


 


Urine Leukocyte Esterase


 Small


(NEGATIVE)


 


Urine RBC


 2 /HPF


(0-2/HPF)


 


Urine WBC


 10 /HPF


(0-5/HPF)


 


Urine Squamous Epithelial


Cells None /LPF


(</=FEW)


 


Urine Bacteria


 Negative /HPF


(NONE-FEW)


 


Urine Mucus


 Few /HPF


(NONE-FEW)











EKG/Imaging


EKG Interpretation


12 lead EKG:


      Rhythm: Sinus bradycardia, rate 57


      Axis: normal 


      QRS: normal


      ST segments: normal


Imaging


CT ANGIOGRAM OF THE CHEST WITH INTRAVENOUS CONTRAST, PE PROTOCOL


 


DATE OF EXAM: 02/05/2019 19:53


 


COMPARISON: Chest radiograph January 10, 2017.


 


INDICATION:  Shortness of breath.


 


TECHNIQUE: Contrast enhanced chest CT performed during the injection of 75 ml of


Isovue-370.  Three-dimensional (MIP) reconstructions were performed.


 


FINDINGS:


 


Negative for pulmonary arterial embolism.  Very small right pleural effusion 


with adjacent atelectasis.  No consolidation or pneumothorax.


 


Thyroid:  Not diagnostically imaged.


 


Thoracic inlet:  No thoracic inlet adenopathy.


 


Heart and great vessels:  Heart size is normal.  Coronary atherosclerosis is 


prominent.


 


Mediastinum and erika:  No mediastinal or hilar adenopathy.


 


Lungs and pleura:  As above.


 


Breast and axilla:  Mild symmetric gynecomastia.


 


Bones and soft tissues:  No acute osseous abnormality.  Nonspecific 1.8 cm lytic


focus in the left fifth rib posteriorly demonstrates no definite cortical 


breakthrough.


 


Upper abdomen: Unremarkable


 


IMPRESSION:


 


1.  Negative for pulmonary arterial embolus.


2.  Small right pleural effusion.


3.  Nonspecific lytic lesion posteriorly in the left fifth rib demonstrates no 


particularly aggressive features.


 


One of the following dose optimization techniques was utilized in the 


performance of this exam: Automated exposure control; adjustment of the mA 


and/or kV according to the patient's size; or use of an iterative  recons


truction technique.  Specific details can be referenced in the facility's 


radiology CT exam operational policy.


 


Report Dictated By: Denise Whitley MD at 2/5/2019 8:50 PM








COMPUTED TOMOGRAPHY OF THE Abdomen and Pelvis with  CONTRAST


 


INDICATION: Abdominal pain and distention.


 


TECHNIQUE: Contiguous axial 3.0 mm CT images were obtained through the abdomen 


and pelvis  after 75 mL Isovue-370. Coronal and sagittal reformatted images were


submitted.


 


COMPARISON: CT February 1, 2019.


 


FINDINGS:


 


Liver and hepatic vasculature:  No focal lesion.


 


Gallbladder and bile ducts:  Normal gallbladder.


 


Spleen:  Normal


 


Pancreas:  Normal


 


Adrenals:  2.9 cm nodule at the left adrenal is indeterminate.  The right 


adrenal is normal.


 


Kidneys, ureters and bladder:  No stone or obstruction.  Symmetric enhancement. 


Subcentimeter lesion in the superior pole of the left kidney is too small to c


haracterize.  Incompletely filled bladder.


 


Retroperitoneum and aorta:  Normal caliber aorta.  Mild atherosclerosis.


 


GI tract, mesentery and peritoneum: Stranding adjacent to the second and third p


ortions of the duodenum is extensive, and the duodenal wall is markedly 


thickened.  There is no discrete fluid collection or free air.  A diverticulum 


is not identified.  Numerous colonic diverticula are without evidence of 


diverticulitis.  The appendix is normal.


 


Prostate: There are calcifications but no enlargement.


 


Bones and soft tissues: No acute osseous abnormality.


 


IMPRESSION:


 


1.  Extensive inflammation adjacent to the second and third portions of the 


duodenum as well as marked wall thickening suggests duodenitis.  There is no 


evidence of perforation.


2.  Indeterminate 2.9 cm left adrenal nodule.  Recommend adrenal mass protocol 


CT to determine washout characteristics.


3.  Very small right pleural effusion.


4.  Colonic diverticula without findings of diverticulitis.


4.  Normal appendix.


 


One of the following dose optimization techniques was utilized in the 


performance of this exam: Automated exposure control; adjustment of the mA 


and/or kV according to the patient's size; or use of an iterative  


reconstruction technique.  Specific details can be referenced in the facility's 


radiology CT exam operational policy.


 


Report Dictated By: Denise Whitley MD at 2/5/2019 9:07 PM





ED Course/Re-evaluation


ED Course


Reviewed the echocardiogram report which shows normal ejection fraction, no 


major abnormalities other than pulmonary hypertension. CT scans were obtained 


and the patient has a duodenitis. This cancerous pain and bloating in the 


epigastric area. Initial evaluation and management with morphine was partially 


effective in relieving the pain. Once the CT scan was back we gave her Protonix 


as well as a GI cocktail and some Carafate and pain was significantly better. No


sign of pulmonary embolism or other pulmonary problem on chest CT. I reviewed 


all this with the patient. He will follow up as planned with all of his other t


esting that needs to be done and this includes follow-up with cardiology or 


pulmonology as needed. He will finish his Levaquin. I gave him Ultram to use for


pain. Also continue him on omeprazole and Carafate.


Decision to Disposition Date:  Feb 5, 2019


Decision to Disposition Time:  23:31





Depart


Departure


Latest Vital Signs





Vital Signs








  Date Time  Temp Pulse Resp B/P (MAP) Pulse Ox O2 Delivery O2 Flow Rate FiO2


 


2/5/19 23:30  61   89   


 


2/5/19 22:49    152/75 (100)    


 


2/5/19 18:13 99.5  20   Room Air  








Impression:  


   Primary Impression:  


   Duodenitis without bleeding


   Additional Impressions:  


   Orchitis


   Pulmonary hypertension


Condition:  Improved


Disposition:  HOME OR SELF-CARE


New Scripts


Ondansetron 4 Mg Odt (ONDANSETRON 4 MG ODT) 4 Mg Tab.rapdis


4 MG PO ONCE PRN for NAUSEA/VOMITING, #20 TAB 0 Refills


   Prov: JLUIS HOUSER MD         2/5/19 


Tramadol Hcl (TRAMADOL HCL) 50 Mg Tablet


50 MG PO Q6H PRN for PAIN, #12 TAB 0 Refills


   Prov: JLUIS HOUSER MD         2/5/19 


Sucralfate (CARAFATE) 1 Gm Tablet


1 GM PO QID, #120 TAB 0 Refills


   Prov: JLUIS HOUSER MD         2/5/19 


Omeprazole (OMEPRAZOLE) 40 Mg Capsule.dr


40 MG PO BID, #60 CAP 0 Refills


   Prov: JLUIS HOUSER MD         2/5/19


Patient Instructions:  Duodenitis (ED)





Additional Instructions:  


You have inflammation in your small intestine, in the area right after the 


stomach.


You will need to take Omeprazole 40mg twice a day.


Start Carafate 1gram tablets four times a day.


You can use over the counter Maalox as needed for stomach pain.


Ultram 50mg tablets every 6 hours as needed for pain.


Zofran 4mg one every 6 hours as needed for nausea.





Keep taking your Levaquin as directed.


Follow-up with your primary care as directed previously.


They will help with arranging further follow-up with specialists as needed.


Consider seeing a GI specialist or a general surgeon for re-evaluation of the 


duodenitis; this sometimes needs an upper scope for further evaluation.





Problem Qualifiers











JLUIS HOUSER MD              Feb 5, 2019 18:24

## 2019-02-05 NOTE — EKG
FACILITY: [facility] [place] 

 

PATIENT NAME: MERISSA MCADAMS

: [id number]

MR: [id number]

V: [id number]

EXAM DATE: 

ORDERING PHYSICIAN: JLUIS HOUSER

TECHNOLOGIST: DAVE

 

Test Reason : SOB

Blood Pressure : ***/*** mmHG

Vent. Rate : 057 BPM     Atrial Rate : 083 BPM

   P-R Int : 158 ms          QRS Dur : 092 ms

    QT Int : 458 ms       P-R-T Axes : 068 025 038 degrees

   QTc Int : 445 ms

 

Normal sinus rhythm

Normal ECG

When compared with ECG of 2019 12:09,

premature ventricular complexes are no longer present

Confirmed by BRENNEN PARK ([id number]) on 2019 6:07:46 AM

 

Referred By:             Confirmed By:BRENNEN PARK

## 2019-02-05 NOTE — RADIOLOGY IMAGING REPORT
FACILITY: [facility] 

 

PATIENT NAME: Santosh Lara

: 1960

MR: [id number]

V: [id number]

EXAM DATE: 

ORDERING PHYSICIAN: JLUIS HOUSER

TECHNOLOGIST: 

 

[place] [facility] [place]

Patient: Santosh Lara

: 1960

MRN: [id number]

Visit/Account:[id number]

Date of Sevice:  2019

 

ACCESSION #: [id number]

 

COMPUTED TOMOGRAPHY OF THE Abdomen and Pelvis with  CONTRAST

 

INDICATION: Abdominal pain and distention.

 

TECHNIQUE: Contiguous axial 3.0 mm CT images were obtained through the abdomen and pelvis  after 75 m
L Isovue-370. Coronal and sagittal reformatted images were submitted.

 

COMPARISON: CT 2019.

 

FINDINGS:

 

 

Liver and hepatic vasculature:  No focal lesion.

 

Gallbladder and bile ducts:  Normal gallbladder.

 

Spleen:  Normal

 

Pancreas:  Normal

 

Adrenals:  2.9 cm nodule at the left adrenal is indeterminate.  The right adrenal is normal.

 

Kidneys, ureters and bladder:  No stone or obstruction.  Symmetric enhancement.  Subcentimeter lesion
 in the superior pole of the left kidney is too small to characterize.  Incompletely filled bladder.

 

Retroperitoneum and aorta:  Normal caliber aorta.  Mild atherosclerosis.

 

GI tract, mesentery and peritoneum: Stranding adjacent to the second and third portions of the duoden
um is extensive, and the duodenal wall is markedly thickened.  There is no discrete fluid collection 
or free air.  A diverticulum is not identified.  Numerous colonic diverticula are without evidence of
 diverticulitis.  The appendix is normal.

 

Prostate: There are calcifications but no enlargement.

 

Bones and soft tissues: No acute osseous abnormality.

 

IMPRESSION:

 

1.  Extensive inflammation adjacent to the second and third portions of the duodenum as well as marke
d wall thickening suggests duodenitis.  There is no evidence of perforation.

2.  Indeterminate 2.9 cm left adrenal nodule.  Recommend adrenal mass protocol CT to determine washou
t characteristics.

3.  Very small right pleural effusion.

4.  Colonic diverticula without findings of diverticulitis.

4.  Normal appendix.

 

 

One of the following dose optimization techniques was utilized in the performance of this exam: Autom
ated exposure control; adjustment of the mA and/or kV according to the patient's size; or use of an i
terative  reconstruction technique.  Specific details can be referenced in the facility's radiology C
T exam operational policy.

 

 

Report Dictated By: Denise Whitley MD at 2019 9:07 PM

 

Report E-Signed By: Denise Whitley MD  at 2019 9:23 PM

 

[id number]

## 2019-02-05 NOTE — RADIOLOGY IMAGING REPORT
FACILITY: [facility] 

 

PATIENT NAME: Santosh Lara

: 1960

MR: [id number]

V: [id number]

EXAM DATE: 

ORDERING PHYSICIAN: JLUIS HOUSER

TECHNOLOGIST: 

 

Location: [facility] - [place]

Patient: Santosh Lara

: 1960

MRN: [id number]

Visit/Account:[id number]

Date of Sevice:  2019

 

ACCESSION #: [id number]

 

CT ANGIOGRAM OF THE CHEST WITH INTRAVENOUS CONTRAST, PE PROTOCOL

 

DATE OF EXAM: 2019 19:53

 

COMPARISON: Chest radiograph January 10, 2017.

 

INDICATION:  Shortness of breath.

 

TECHNIQUE: Contrast enhanced chest CT performed during the injection of 75 ml of Isovue-370.  Three-d
imensional (MIP) reconstructions were performed.

 

FINDINGS:

 

Negative for pulmonary arterial embolism.  Very small right pleural effusion with adjacent atelectasi
s.  No consolidation or pneumothorax.

 

Thyroid:  Not diagnostically imaged.

 

Thoracic inlet:  No thoracic inlet adenopathy.

 

Heart and great vessels:  Heart size is normal.  Coronary atherosclerosis is prominent.

 

Mediastinum and erika:  No mediastinal or hilar adenopathy.

 

Lungs and pleura:  As above.

 

Breast and axilla:  Mild symmetric gynecomastia.

 

Bones and soft tissues:  No acute osseous abnormality.  Nonspecific 1.8 cm lytic focus in the left fi
fth rib posteriorly demonstrates no definite cortical breakthrough.

 

Upper abdomen: Unremarkable

 

 

IMPRESSION:

 

1.  Negative for pulmonary arterial embolus.

2.  Small right pleural effusion.

3.  Nonspecific lytic lesion posteriorly in the left fifth rib demonstrates no particularly aggressiv
e features.

 

 

 

 

One of the following dose optimization techniques was utilized in the performance of this exam: Autom
ated exposure control; adjustment of the mA and/or kV according to the patient's size; or use of an i
terative  reconstruction technique.  Specific details can be referenced in the facility's radiology C
T exam operational policy.

 

 

 

Report Dictated By: Denise Whitley MD at 2019 8:50 PM

 

Report E-Signed By: Denise Whitley MD  at 2019 9:07 PM

 

WSN:[id number]

## 2019-02-07 NOTE — RT HOLTER TEST
FACILITY: [facility] 

 

PATIENT NAME: MERISSA MCADAMS

: [id number]

MR: [id number]

V: [id number]

EXAM DATE: 

ORDERING PHYSICIAN: GIL PORRAS

TECHNOLOGIST: LOUISA Gamble date: 2019   15:10:00       Duration: 44:06:00

Test Indications: AFIB

Medications: 

              

              

              

              

              

              

              

[id number] QRS complexes

   805 Ventricular      ectopics which represent    <1 % of total QRS comp.

   182 Supraventricular ectopics which represent    <1 % of total QRS comp.

     * Paced QRS complexes       which represent  **** % of total QRS comp.

VENTRICULAR ECTOPY

   785 Isolated

    13 Bigeminal Cycles

     2 Couplets

     3 Runs

    16 Beats in Runs

    10 Beats LONGEST at 165  BPM at 18:00:56 2019

    10 Beats FASTEST at 165  BPM at 18:00:56 2019

SUPRAVENTRICULAR ECTOPY

   135 Isolated

    14 Couplets

     5 Runs

    19 Beats in Runs

     5 Beats LONGEST at  89  BPM at 18:02:26 2019

     3 Beats FASTEST at  94  BPM at 21:56:42 2019

HEART RATES

    42 MIN at 07:52:06 2019

    60 AVG

   170 MAX at 18:00:59 2019

LONGEST RR

     1.648 secs at 05:03:30 2019

     Channel 2

          -12.800 mm MIN at 15:10:00 2019

          -12.800 mm MAX at 15:10:00 2019

     Channel 3

          -12.800 mm MIN at 15:10:00 2019

          -12.800 mm MAX at 15:10:00 2019

 

 

The patient had no reported symptoms during the study.  He was predominantly in a sinus rhythm.  He h
ad an episode of a 9  beat run of ventricular

ectopy at 165 bpm (nonsustained ventricular tachycardia).  He had a 6 beat runs of supraventricular  
ectopy at 164 bpm.

Confirmed by GIL PORRAS ([id number]) on 2019 1:35:28 AM

 

Referred By:             Overread By: GIL PORRAS

## 2019-02-07 NOTE — MISCELLANEOUS PROVIDER NOTE
Miscellaneous Provider Note


Note


He had a Holter monitor done as documented below.  There was only a very short 


run of SVE.  Otherwise, he was predominantly in a NSR.  However, he did have 


non-sustained ventricular tachycardia of 9 beats that was asymptomatic.  He has 


already had an echo that showed an EF of 60-65% with severe pulmonary 


hypertension and enlarged RV.  His electrolytes are wnl. At this time there 


would be no further work up for NSVT.  He will need to follow up with the 


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 


stated in the discharge summary "Special Instructions".  Likely, he would 


benefit from follow up with Cardiology in regards to the transient atrial 


fibrillation, also.











Test Indications: AFIB


Medications: 


              


              


              


              


              


              


              


[id number] QRS complexes


   805 Ventricular      ectopics which represent    <1 % of total QRS comp.


   182 Supraventricular ectopics which represent    <1 % of total QRS comp.


     * Paced QRS complexes       which represent  **** % of total QRS comp.


VENTRICULAR ECTOPY


   785 Isolated


    13 Bigeminal Cycles


     2 Couplets


     3 Runs


    16 Beats in Runs


    10 Beats LONGEST at 165  BPM at 18:00:56 2019-02-04


    10 Beats FASTEST at 165  BPM at 18:00:56 2019-02-04


SUPRAVENTRICULAR ECTOPY


   135 Isolated


    14 Couplets


     5 Runs


    19 Beats in Runs


     5 Beats LONGEST at  89  BPM at 18:02:26 2019-02-04


     3 Beats FASTEST at  94  BPM at 21:56:42 2019-02-04


HEART RATES


    42 MIN at 07:52:06 2019-02-06


    60 AVG


   170 MAX at 18:00:59 2019-02-04


LONGEST RR


     1.648 secs at 05:03:30 2019-02-06


     Channel 2


          -12.800 mm MIN at 15:10:00 2019-02-04


          -12.800 mm MAX at 15:10:00 2019-02-04


     Channel 3


          -12.800 mm MIN at 15:10:00 2019-02-04


          -12.800 mm MAX at 15:10:00 2019-02-04


 


 


The patient had no reported symptoms during the study.  He was predominantly in 


a sinus rhythm.  He had an episode of a 9  beat run of ventricular


ectopy at 165 bpm (nonsustained ventricular tachycardia).  He had a 6 beat runs 


of supraventricular  ectopy at 164 bpm.


Confirmed by GIL PORRAS ([id number]) on 2/7/2019 1:35:28 AM


 


Referred By:             Overread By: GIL PORRAS


Copies to:   ; [facility]











GIL PORRAS MD                Feb 7, 2019 06:51